# Patient Record
Sex: FEMALE | Race: WHITE | NOT HISPANIC OR LATINO | Employment: FULL TIME | URBAN - METROPOLITAN AREA
[De-identification: names, ages, dates, MRNs, and addresses within clinical notes are randomized per-mention and may not be internally consistent; named-entity substitution may affect disease eponyms.]

---

## 2018-10-11 ASSESSMENT — ANXIETY QUESTIONNAIRES
4. TROUBLE RELAXING: NOT AT ALL
3. WORRYING TOO MUCH ABOUT DIFFERENT THINGS: NOT AT ALL
6. BECOMING EASILY ANNOYED OR IRRITABLE: NOT AT ALL
1. FEELING NERVOUS, ANXIOUS, OR ON EDGE: NOT AT ALL
GAD7 TOTAL SCORE: 0
GAD7 TOTAL SCORE: 0
7. FEELING AFRAID AS IF SOMETHING AWFUL MIGHT HAPPEN: NOT AT ALL
5. BEING SO RESTLESS THAT IT IS HARD TO SIT STILL: NOT AT ALL
2. NOT BEING ABLE TO STOP OR CONTROL WORRYING: NOT AT ALL
7. FEELING AFRAID AS IF SOMETHING AWFUL MIGHT HAPPEN: NOT AT ALL

## 2018-10-11 ASSESSMENT — ENCOUNTER SYMPTOMS
NUMBNESS: 0
NECK MASS: 0
MUSCLE WEAKNESS: 0
DOUBLE VISION: 0
SORE THROAT: 0
HOARSE VOICE: 0
SYNCOPE: 0
MUSCLE CRAMPS: 1
NAIL CHANGES: 0
DECREASED LIBIDO: 1
DISTURBANCES IN COORDINATION: 0
TREMORS: 0
WEAKNESS: 0
HYPERTENSION: 0
MEMORY LOSS: 1
SPEECH CHANGE: 0
PALPITATIONS: 1
LIGHT-HEADEDNESS: 0
SMELL DISTURBANCE: 0
TINGLING: 0
ARTHRALGIAS: 1
SINUS CONGESTION: 0
ORTHOPNEA: 0
BACK PAIN: 0
POOR WOUND HEALING: 0
HYPOTENSION: 0
MYALGIAS: 0
SLEEP DISTURBANCES DUE TO BREATHING: 0
EXERCISE INTOLERANCE: 0
HOT FLASHES: 1
EYE REDNESS: 1
JOINT SWELLING: 0
DIZZINESS: 0
LOSS OF CONSCIOUSNESS: 0
SKIN CHANGES: 0
SEIZURES: 0
EYE IRRITATION: 0
STIFFNESS: 1
EYE PAIN: 0
PARALYSIS: 0
EYE WATERING: 0
TROUBLE SWALLOWING: 0
HEADACHES: 1
SINUS PAIN: 0
TASTE DISTURBANCE: 0
LEG PAIN: 0
NECK PAIN: 1

## 2018-10-12 ENCOUNTER — OFFICE VISIT (OUTPATIENT)
Dept: OBGYN | Facility: CLINIC | Age: 53
End: 2018-10-12
Attending: OBSTETRICS & GYNECOLOGY
Payer: COMMERCIAL

## 2018-10-12 VITALS — WEIGHT: 126.2 LBS | BODY MASS INDEX: 21.02 KG/M2 | HEIGHT: 65 IN

## 2018-10-12 DIAGNOSIS — N90.5 ATROPHY, VULVA: Primary | ICD-10-CM

## 2018-10-12 DIAGNOSIS — L90.0 LICHEN SCLEROSUS ET ATROPHICUS: ICD-10-CM

## 2018-10-12 RX ORDER — METRONIDAZOLE 500 MG/1
TABLET ORAL
COMMUNITY
Start: 2018-08-23 | End: 2024-02-07

## 2018-10-12 RX ORDER — FLUOCINONIDE GEL 0.5 MG/G
GEL TOPICAL
COMMUNITY
Start: 2018-04-24

## 2018-10-12 RX ORDER — CYCLOSPORINE 0.5 MG/ML
EMULSION OPHTHALMIC
COMMUNITY
Start: 2018-09-01 | End: 2024-02-07

## 2018-10-12 RX ORDER — ESTRADIOL 0.1 MG/G
2 CREAM VAGINAL
Qty: 60 G | Refills: 11 | Status: SHIPPED | OUTPATIENT
Start: 2018-10-15 | End: 2024-02-07

## 2018-10-12 RX ORDER — LEVOTHYROXINE SODIUM 88 UG/1
88 CAPSULE ORAL DAILY
COMMUNITY
Start: 2010-01-01

## 2018-10-12 RX ORDER — CLOBETASOL PROPIONATE 0.5 MG/G
OINTMENT TOPICAL PRN
COMMUNITY
Start: 2018-06-19

## 2018-10-12 ASSESSMENT — ANXIETY QUESTIONNAIRES: GAD7 TOTAL SCORE: 0

## 2018-10-12 NOTE — LETTER
10/12/2018       RE: Rose Emerson  [address] [facility] [address]     Dear Colleague,    Thank you for referring your patient, Rose Emerson, to the [facility] at [facility]. Please see a copy of my visit note below.    Progress Note    SUBJECTIVE:  Rose Emerson is an 52 year old PM  here for second opinion for lichen sclerosus of vulva.      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       HISTORY:  Prescription Medications as of 10/12/2018             clobetasol (TEMOVATE) 0.05 % ointment     cycloSPORINE (RESTASIS MULTIDOSE) 0.05 % ophthalmic emulsion     estradiol (ESTRACE VAGINAL) 0.1 MG/GM cream Starting on 10/15/2018. Place 2 g vaginally twice a week    fluocinonide (LIDEX) 0.05 % topical gel     levothyroxine (SYNTHROID) 75 MCG tablet     metroNIDAZOLE (FLAGYL) 500 MG tablet     Olopatadine HCl (PAZEO) 0.7 % ophthalmic solution         Allergies   Allergen Reactions     Amoxicillin Hives     Sulfa Drugs Nausea       There is no immunization history on file for this patient.    Obstetric History       T0      L2     SAB0   TAB0   Ectopic0   Multiple0   Live Births0      Past Medical History:   Diagnosis Date     Hx of previous reproductive problem 2574-6111     Thyroid disease      Past Surgical History:   Procedure Laterality Date     ABDOMEN SURGERY  1998    left ovary removed     BIOPSY  2018, 2016, 2017    vaginal, leg, and cheek     COLONOSCOPY       ENT SURGERY  Oct 2017    neck surgery     GENITOURINARY SURGERY      kidney stone surgery     GYN SURGERY      left ovary removed, infertility procedures     HEAD & NECK SURGERY      neck surgery     ORTHOPEDIC SURGERY  2015    shoulder     Family History   Problem Relation Age of Onset     Other  "Cancer Father      esophogial     Substance Abuse Father      alcohol     Anxiety Disorder Mother      Depression Mother      Thyroid Disease Mother      Substance Abuse Sister      alcohol     Social History     Social History     Marital status:      Spouse name: N/A     Number of children: N/A     Years of education: N/A     Social History Main Topics     Smoking status: Never Smoker     Smokeless tobacco: Never Used     Alcohol use No     Drug use: No     Sexual activity: Not Currently     Partners: Male     Birth control/ protection: Post-menopausal, Female Surgical     Other Topics Concern     None     Social History Narrative     None       ROS    EXAM:  Height 1.651 m (5' 5\"), weight 57.2 kg (126 lb 3.2 oz). Body mass index is 21 kg/(m^2).  General - pleasant female in no acute distress.  Skin - no suspicious lesions or rashes  EENT-  PERRLA, euthyroid with out palpable nodules  Neck - supple without lymphadenopathy.  Lungs - clear to auscultation bilaterally.  Heart - regular rate and rhythm without murmur.  Abdomen - soft, nontender, nondistended, no masses or organomegaly noted.  Musculoskeletal - no gross deformities.  Neurological - normal strength, sensation, and mental status.    Breast Exam:  Breast: Without visible skin changes. No dimpling or lesions seen.   Breasts supple, non-tender with palpation, no dominant mass, nodularity, or nipple discharge noted bilaterally. Axillary nodes negative.      Pelvic Exam:  EG/BUS: pale, thin, pink skin. No discreet lesions. Complete simplification of right labium minorum. some reduction of left labium minorum. Complete simplification of clitoral davis.    Urethral meatus: normal   Urethra: no masses, tenderness, or scarring   Bladder: no masses or tenderness   Vagina: think dry and pale mucosa with petechiae.  odorless and clear  secretions  Anus: no lesions, erythematous plaques bilaterally and circumferentially around anus    ASSESSMENT:  Lichen " sclerosus et atrophicus     PLAN:      Estrogen Cream:  - Start using two times a weeks, applying internally with extra cream on the vaginal opening  - Continue to use twice/week until next follow-up appointment    Clobetasol Cream:  - Only use when having increased irritation or flare  - Start with application to external genitialia 4x per day for three days   - 3x per day for three days   - 2x per day for three days   - 1x per day for three days   - Discontinue until next flare  * On the days when you use clobetasol and estrogen cream, it is ok to use them at the same time and mix    Ok to attempt sexual intercourse when you feel comfortable or willing to try    Ashley Cooper          Again, thank you for allowing me to participate in the care of your patient.      Sincerely,    Ashley Cooper MD

## 2018-10-12 NOTE — PATIENT INSTRUCTIONS
Estrogen Cream:  - Start using two times a weeks, applying internally with extra cream on the vaginal opening  - Continue to use twice/week until next follow-up appointment    Clobetasol Cream:  - Only use when having increased irritation or flare  - Start with application to external genitialia 4x per day for three days   - 3x per day for three days   - 2x per day for three days   - 1x per day for three days   - Discontinue until next flare  * On the days when you use clobetasol and estrogen cream, it is ok to use them at the same time and mix    Ok to attempt sexual intercourse when you feel comfortable or willing to try

## 2018-10-12 NOTE — PROGRESS NOTES
Progress Note    SUBJECTIVE:  Rose Emerson is an 52 year old PM  here for second opinion for lichen sclerosus of vulva.      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       HISTORY:  Prescription Medications as of 10/12/2018             clobetasol (TEMOVATE) 0.05 % ointment     cycloSPORINE (RESTASIS MULTIDOSE) 0.05 % ophthalmic emulsion     estradiol (ESTRACE VAGINAL) 0.1 MG/GM cream Starting on 10/15/2018. Place 2 g vaginally twice a week    fluocinonide (LIDEX) 0.05 % topical gel     levothyroxine (SYNTHROID) 75 MCG tablet     metroNIDAZOLE (FLAGYL) 500 MG tablet     Olopatadine HCl (PAZEO) 0.7 % ophthalmic solution         Allergies   Allergen Reactions     Amoxicillin Hives     Sulfa Drugs Nausea       There is no immunization history on file for this patient.    Obstetric History       T0      L2     SAB0   TAB0   Ectopic0   Multiple0   Live Births0      Past Medical History:   Diagnosis Date     Hx of previous reproductive problem [id number]     Thyroid disease      Past Surgical History:   Procedure Laterality Date     ABDOMEN SURGERY  1998    left ovary removed     BIOPSY  2018, , 2017    vaginal, leg, and cheek     COLONOSCOPY       ENT SURGERY  Oct 2017    neck surgery     GENITOURINARY SURGERY      kidney stone surgery     GYN SURGERY      left ovary removed, infertility procedures     HEAD & NECK SURGERY      neck surgery     ORTHOPEDIC SURGERY  2015    shoulder     Family History   Problem Relation Age of Onset     Other Cancer Father      esophogial     Substance Abuse Father      alcohol     Anxiety Disorder Mother      Depression Mother      Thyroid Disease Mother      Substance Abuse Sister      alcohol     Social History     Social History     Marital status:      Spouse name: N/A     Number of  "children: N/A     Years of education: N/A     Social History Main Topics     Smoking status: Never Smoker     Smokeless tobacco: Never Used     Alcohol use No     Drug use: No     Sexual activity: Not Currently     Partners: Male     Birth control/ protection: Post-menopausal, Female Surgical     Other Topics Concern     None     Social History Narrative     None       ROS    EXAM:  Height 1.651 m (5' 5\"), weight 57.2 kg (126 lb 3.2 oz). Body mass index is 21 kg/(m^2).  General - pleasant female in no acute distress.  Skin - no suspicious lesions or rashes  EENT-  PERRLA, euthyroid with out palpable nodules  Neck - supple without lymphadenopathy.  Lungs - clear to auscultation bilaterally.  Heart - regular rate and rhythm without murmur.  Abdomen - soft, nontender, nondistended, no masses or organomegaly noted.  Musculoskeletal - no gross deformities.  Neurological - normal strength, sensation, and mental status.    Breast Exam:  Breast: Without visible skin changes. No dimpling or lesions seen.   Breasts supple, non-tender with palpation, no dominant mass, nodularity, or nipple discharge noted bilaterally. Axillary nodes negative.      Pelvic Exam:  EG/BUS: pale, thin, pink skin. No discreet lesions. Complete simplification of right labium minorum. some reduction of left labium minorum. Complete simplification of clitoral davis.    Urethral meatus: normal   Urethra: no masses, tenderness, or scarring   Bladder: no masses or tenderness   Vagina: think dry and pale mucosa with petechiae.  odorless and clear  secretions  Anus: no lesions, erythematous plaques bilaterally and circumferentially around anus    ASSESSMENT:  Lichen sclerosus et atrophicus     PLAN:      Estrogen Cream:  - Start using two times a weeks, applying internally with extra cream on the vaginal opening  - Continue to use twice/week until next follow-up appointment    Clobetasol Cream:  - Only use when having increased irritation or flare  - Start " with application to external genitialia 4x per day for three days   - 3x per day for three days   - 2x per day for three days   - 1x per day for three days   - Discontinue until next flare  * On the days when you use clobetasol and estrogen cream, it is ok to use them at the same time and mix    Ok to attempt sexual intercourse when you feel comfortable or willing to try    Ashley Cooper

## 2018-10-12 NOTE — LETTER
Date:October 16, 2018      Patient was self referred, no letter generated. Do not send.        [facility] Information

## 2018-10-12 NOTE — LETTER
October 12, 2018        Rose Emerson  [address] [facility] [address]    To Whom it May Concern:    Rose Emerson was seen in our office on 10/12/2018 and was given the following instructions:  Use a stand up desk at work with the option to sit every 2 hours.    Sincerely,        Ashley Cooper MD

## 2018-10-12 NOTE — MR AVS SNAPSHOT
After Visit Summary   10/12/2018    Rose Emerson    MRN: [id number]           Patient Information     Date Of Birth          1965        Visit Information        Provider Department      10/12/2018 1:15 PM Ashley Cooper MD [facility]        Today's Diagnoses     Atrophy, vulva    -  1      Care Instructions    Estrogen Cream:  - Start using two times a weeks, applying internally with extra cream on the vaginal opening  - Continue to use twice/week until next follow-up appointment    Clobetasol Cream:  - Only use when having increased irritation or flare  - Start with application to external genitialia 4x per day for three days   - 3x per day for three days   - 2x per day for three days   - 1x per day for three days   - Discontinue until next flare  * On the days when you use clobetasol and estrogen cream, it is ok to use them at the same time and mix    Ok to attempt sexual intercourse when you feel comfortable or willing to try          Follow-ups after your visit        Follow-up notes from your care team     Return in about 6 weeks (around 11/23/2018) for Follow-up.      Who to contact     Please call your clinic at [phone] to:    Ask questions about your health    Make or cancel appointments    Discuss your medicines    Learn about your test results    Speak to your doctor            Additional Information About Your Visit        [employer] Information     [employer] gives you secure access to your electronic health record. If you see a primary care provider, you can also send messages to your care team and make appointments. If you have questions, please call your primary care clinic.  If you do not have a primary care provider, please call [phone] and they will assist you.      [employer] is an electronic gateway that provides easy, online access to your medical records. With [employer], you can request a clinic appointment, read your test results, renew a  "prescription or communicate with your care team.     To access your existing account, please contact your [facility] or call [phone] for assistance.        Care EveryWhere ID     This is your Care EveryWhere ID. This could be used by other organizations to access your [place] medical records  [id number]        Your Vitals Were     Height BMI (Body Mass Index)                1.651 m (5' 5\") 21 kg/m2           Blood Pressure from Last 3 Encounters:   No data found for BP    Weight from Last 3 Encounters:   10/12/18 57.2 kg (126 lb 3.2 oz)              Today, you had the following     No orders found for display         Today's Medication Changes          These changes are accurate as of 10/12/18  1:50 PM.  If you have any questions, ask your nurse or doctor.               Start taking these medicines.        Dose/Directions    estradiol 0.1 MG/GM cream   Commonly known as:  ESTRACE VAGINAL   Used for:  Atrophy, vulva   Started by:  Ashley Cooper MD        Dose:  2 g   Start taking on:  10/15/2018   Place 2 g vaginally twice a week   Quantity:  60 g   Refills:  11            Where to get your medicines      These medications were sent to [facility][id number] - [facility] [address]  [address] [address]     Phone:  [phone]     estradiol 0.1 MG/GM cream                Primary Care Provider Fax #    Physician No Ref-Primary [phone]       No address on file        Equal Access to Services     DANIELLE BLACKMAN AH: Hadii cortez larsono Somark, waaxda luqadaha, qaybta kaalmada ademelonyda, akshat rolon . So [facility] [phone].    ATENCIÓN: Si habla español, tiene a strange disposición servicios gratuitos de asistencia lingüística. Llame al [phone].    We comply with applicable federal civil rights laws and [place] laws. We do not discriminate on the basis of race, color, national origin, age, disability, sex, sexual orientation, " or gender identity.            Thank you!     Thank you for choosing [facility]  for your care. Our goal is always to provide you with excellent care. Hearing back from our patients is one way we can continue to improve our services. Please take a few minutes to complete the written survey that you may receive in the mail after your visit with us. Thank you!             Your Updated Medication List - Protect others around you: Learn how to safely use, store and throw away your medicines at www.disposemymeds.org.          This list is accurate as of 10/12/18  1:50 PM.  Always use your most recent med list.                   Brand Name Dispense Instructions for use Diagnosis    clobetasol 0.05 % ointment    TEMOVATE          estradiol 0.1 MG/GM cream   Start taking on:  10/15/2018    ESTRACE VAGINAL    60 g    Place 2 g vaginally twice a week    Atrophy, vulva       fluocinonide 0.05 % topical gel    LIDEX          metroNIDAZOLE 500 MG tablet    FLAGYL          PAZEO 0.7 % ophthalmic solution   Generic drug:  Olopatadine HCl           RESTASIS MULTIDOSE 0.05 % ophthalmic emulsion   Generic drug:  cycloSPORINE           SYNTHROID 75 MCG tablet   Generic drug:  levothyroxine

## 2018-10-15 ENCOUNTER — HEALTH MAINTENANCE LETTER (OUTPATIENT)
Age: 53
End: 2018-10-15

## 2018-12-05 ENCOUNTER — OFFICE VISIT (OUTPATIENT)
Dept: OBGYN | Facility: CLINIC | Age: 53
End: 2018-12-05
Attending: OBSTETRICS & GYNECOLOGY
Payer: COMMERCIAL

## 2018-12-05 VITALS
BODY MASS INDEX: 20.99 KG/M2 | WEIGHT: 126 LBS | SYSTOLIC BLOOD PRESSURE: 107 MMHG | HEIGHT: 65 IN | HEART RATE: 65 BPM | DIASTOLIC BLOOD PRESSURE: 65 MMHG

## 2018-12-05 DIAGNOSIS — L90.0 LICHEN SCLEROSUS: Primary | ICD-10-CM

## 2018-12-05 RX ORDER — SILVER SULFADIAZINE 10 MG/G
CREAM TOPICAL
Qty: 60 G | Refills: 3 | Status: SHIPPED | OUTPATIENT
Start: 2018-12-05 | End: 2024-02-07

## 2018-12-05 NOTE — MR AVS SNAPSHOT
"              After Visit Summary   12/5/2018    Rose Emerson    MRN: [id number]           Patient Information     Date Of Birth          1965        Visit Information        Provider Department      12/5/2018 11:00 AM Ashley Cooper MD [facility]        Today's Diagnoses     Lichen sclerosus    -  1       Follow-ups after your visit        Who to contact     Please call your clinic at [phone] to:    Ask questions about your health    Make or cancel appointments    Discuss your medicines    Learn about your test results    Speak to your doctor            Additional Information About Your Visit        MyChart Information     [employer] gives you secure access to your electronic health record. If you see a primary care provider, you can also send messages to your care team and make appointments. If you have questions, please call your primary care clinic.  If you do not have a primary care provider, please call [phone] and they will assist you.      [employer] is an electronic gateway that provides easy, online access to your medical records. With [employer], you can request a clinic appointment, read your test results, renew a prescription or communicate with your care team.     To access your existing account, please contact your [facility] or call [phone] for assistance.        Care EveryWhere ID     This is your Care EveryWhere ID. This could be used by other organizations to access your [place] medical records  [id number]        Your Vitals Were     Pulse Height BMI (Body Mass Index)             65 1.651 m (5' 5\") 20.97 kg/m2          Blood Pressure from Last 3 Encounters:   12/05/18 107/65    Weight from Last 3 Encounters:   12/05/18 57.2 kg (126 lb)   10/12/18 57.2 kg (126 lb 3.2 oz)              Today, you had the following     No orders found for display         Today's Medication Changes          These changes are accurate as of " 12/5/18 11:59 PM.  If you have any questions, ask your nurse or doctor.               Start taking these medicines.        Dose/Directions    silver sulfADIAZINE 1 % external cream   Commonly known as:  SILVADENE   Used for:  Lichen sclerosus   Started by:  Ashley Cooper MD        Apply small amount (half inch squeeze) to vulva prn dryness or pain.   Quantity:  60 g   Refills:  3            Where to get your medicines      These medications were sent to [facility] #[id number] - [place], [place] - [address]  [address], [facility] [address]     Phone:  [phone]     silver sulfADIAZINE 1 % external cream                Primary Care Provider Fax #    Physician No Ref-Primary [phone]       No address on file        Equal Access to Services     DANIELLE BLACKMAN : Kapil Moncada, waaxda luqadaha, qaybta kaalmada adeegyaalicja, akshat rolon . So [facility] [phone].    ATENCIÓN: Si habla español, tiene a strange disposición servicios gratuitos de asistencia lingüística. [facility] [phone].    We comply with applicable federal civil rights laws and [place] laws. We do not discriminate on the basis of race, color, national origin, age, disability, sex, sexual orientation, or gender identity.            Thank you!     Thank you for choosing [facility]  for your care. Our goal is always to provide you with excellent care. Hearing back from our patients is one way we can continue to improve our services. Please take a few minutes to complete the written survey that you may receive in the mail after your visit with us. Thank you!             Your Updated Medication List - Protect others around you: Learn how to safely use, store and throw away your medicines at www.disposemymeds.org.          This list is accurate as of 12/5/18 11:59 PM.  Always use your most recent med list.                   Brand Name Dispense Instructions for use Diagnosis    clobetasol  0.05 % external ointment    TEMOVATE          estradiol 0.1 MG/GM vaginal cream    ESTRACE VAGINAL    60 g    Place 2 g vaginally twice a week    Atrophy, vulva       fluocinonide 0.05 % external gel    LIDEX          metroNIDAZOLE 500 MG tablet    FLAGYL          PAZEO 0.7 % ophthalmic solution   Generic drug:  olopatadine           RESTASIS MULTIDOSE 0.05 % ophthalmic emulsion   Generic drug:  cycloSPORINE           silver sulfADIAZINE 1 % external cream    SILVADENE    60 g    Apply small amount (half inch squeeze) to vulva prn dryness or pain.    Lichen sclerosus       SYNTHROID 75 MCG tablet   Generic drug:  levothyroxine

## 2018-12-05 NOTE — PROGRESS NOTES
"S: states she didn't start topical estrogen due to fears of stroke (her mother had at age 54 on oral premarin) and other clotting/bleeding history in family.   Not able to have IC but not wanting to use E2 or dilators.     States symptoms relatively well controlled with clobetasol. Discussed possible use of silvadene in lieu of estrogen.     Patient Active Problem List   Diagnosis     Lichen sclerosus et atrophicus of the vulva     Past Medical History:   Diagnosis Date     Hx of previous reproductive problem 7746-3030     Thyroid disease 2010     /65  Pulse 65  Ht 1.651 m (5' 5\")  Wt 57.2 kg (126 lb)  BMI 20.97 kg/m2  Appears well  EG:  pale, thin, pink skin. Complete simplification of right labium minorum. some reduction of left labium minorum. Complete simplification of clitoral davis.  no lesions. Introitus admits 2 digits.         A: lichen sclerosus  P: consider adding silvadene  Consider dilators if IC desired  Consider referral familial counseling center for vague but concerning family history of bleeding/clotting disorders.     Ashley Cooper                     "

## 2018-12-05 NOTE — LETTER
Date:December 10, 2018      Patient was self referred, no letter generated. Do not send.        [facility] Information

## 2018-12-05 NOTE — LETTER
"12/5/2018       RE: Rose Emerson  [address] [address] [address]     Dear Colleague,    Thank you for referring your patient, Rose Emerson, to the [facility] at [facility]. Please see a copy of my visit note below.    S: states she didn't start topical estrogen due to fears of stroke (her mother had at age 54 on oral premarin) and other clotting/bleeding history in family.   Not able to have IC but not wanting to use E2 or dilators.     States symptoms relatively well controlled with clobetasol. Discussed possible use of silvadene in lieu of estrogen.     Patient Active Problem List   Diagnosis     Lichen sclerosus et atrophicus of the vulva     Past Medical History:   Diagnosis Date     Hx of previous reproductive problem 4235-9053     Thyroid disease 2010     /65  Pulse 65  Ht 1.651 m (5' 5\")  Wt 57.2 kg (126 lb)  BMI 20.97 kg/m2  Appears well  EG:  pale, thin, pink skin. Complete simplification of right labium minorum. some reduction of left labium minorum. Complete simplification of clitoral davis.   no lesions. Introitus admits 2 digits.         A: lichen sclerosus  P: consider adding silvadene  Consider dilators if IC desired  Consider referral familial counseling center for vague but concerning family history of bleeding/clotting disorders.     Ashley Cooper                          Again, thank you for allowing me to participate in the care of your patient.      Sincerely,    Ashley Cooper MD      "

## 2019-11-06 ENCOUNTER — HEALTH MAINTENANCE LETTER (OUTPATIENT)
Age: 54
End: 2019-11-06

## 2020-11-29 ENCOUNTER — HEALTH MAINTENANCE LETTER (OUTPATIENT)
Age: 55
End: 2020-11-29

## 2021-09-19 ENCOUNTER — HEALTH MAINTENANCE LETTER (OUTPATIENT)
Age: 56
End: 2021-09-19

## 2021-11-14 ENCOUNTER — HEALTH MAINTENANCE LETTER (OUTPATIENT)
Age: 56
End: 2021-11-14

## 2022-01-09 ENCOUNTER — HEALTH MAINTENANCE LETTER (OUTPATIENT)
Age: 57
End: 2022-01-09

## 2022-11-21 ENCOUNTER — HEALTH MAINTENANCE LETTER (OUTPATIENT)
Age: 57
End: 2022-11-21

## 2023-04-16 ENCOUNTER — HEALTH MAINTENANCE LETTER (OUTPATIENT)
Age: 58
End: 2023-04-16

## 2023-11-26 ENCOUNTER — HEALTH MAINTENANCE LETTER (OUTPATIENT)
Age: 58
End: 2023-11-26

## 2024-01-16 ENCOUNTER — MEDICAL CORRESPONDENCE (OUTPATIENT)
Dept: HEALTH INFORMATION MANAGEMENT | Facility: CLINIC | Age: 59
End: 2024-01-16
Payer: COMMERCIAL

## 2024-02-07 RX ORDER — BETAMETHASONE DIPROPIONATE 0.05 %
GEL (GRAM) TOPICAL 2 TIMES DAILY PRN
COMMUNITY
End: 2024-02-07

## 2024-02-07 RX ORDER — CYCLOBENZAPRINE HCL 10 MG
10 TABLET ORAL 3 TIMES DAILY PRN
COMMUNITY

## 2024-02-07 RX ORDER — FOLIC ACID 1 MG/1
1 TABLET ORAL DAILY
COMMUNITY

## 2024-02-07 RX ORDER — PANTOPRAZOLE SODIUM 40 MG/1
40 TABLET, DELAYED RELEASE ORAL DAILY
COMMUNITY

## 2024-02-07 RX ORDER — CLONAZEPAM 0.25 MG/1
.25-.5 TABLET, ORALLY DISINTEGRATING ORAL
COMMUNITY
End: 2024-02-07

## 2024-02-07 RX ORDER — ATORVASTATIN CALCIUM 20 MG/1
20 TABLET, FILM COATED ORAL DAILY
COMMUNITY

## 2024-02-07 RX ORDER — DESONIDE 0.5 MG/G
OINTMENT TOPICAL 2 TIMES DAILY PRN
COMMUNITY

## 2024-02-07 RX ORDER — CITALOPRAM HYDROBROMIDE 10 MG/1
10 TABLET ORAL DAILY
COMMUNITY

## 2024-02-07 RX ORDER — LIDOCAINE HYDROCHLORIDE 20 MG/ML
SOLUTION OROPHARYNGEAL EVERY 4 HOURS PRN
COMMUNITY

## 2024-02-07 RX ORDER — TRIAMCINOLONE ACETONIDE 1 MG/G
OINTMENT TOPICAL 2 TIMES DAILY
COMMUNITY

## 2024-02-07 RX ORDER — TIZANIDINE 2 MG/1
2 TABLET ORAL EVERY 6 HOURS PRN
COMMUNITY
End: 2024-02-07

## 2024-02-07 NOTE — PROGRESS NOTES
PTA medications updated by Medication Scribe prior to surgery via phone call with patient (last doses completed by Nurse)     Medication history sources: Patient and H&P  In the past week, patient estimated taking medication this percent of the time: Greater than 90%      Significant changes made to the medication list:  Patient reports no longer taking the following meds (med scribe removed from PTA med list): Augmented betamethasone gel, Klonopin, flagyl, olopatadine, silvadene, Restasis, Estrace vag cream      Additional medication history information:   Patient was advised to bring: Xiidra OP soln.    Medication reconciliation completed by provider prior to medication history? No    Time spent in this activity: 60 minutes    The information provided in this note is only as accurate as the sources available at the time of update(s)      Prior to Admission medications    Medication Sig Last Dose Taking? Auth Provider Long Term End Date   atorvastatin (LIPITOR) 20 MG tablet Take 20 mg by mouth daily 2/7/2024 at pm Yes Reported, Patient Yes    citalopram (CELEXA) 10 MG tablet Take 10 mg by mouth daily 2/7/2024 at pm Yes Reported, Patient Yes    clobetasol (TEMOVATE) 0.05 % ointment as needed Unknown at prn Yes Reported, Patient     cyclobenzaprine (FLEXERIL) 10 MG tablet Take 10 mg by mouth 3 times daily as needed for muscle spasms Unknown at prn Yes Reported, Patient     desonide (DESOWEN) 0.05 % external ointment Apply topically 2 times daily as needed Unknown at prn Yes Reported, Patient     fluocinonide (LIDEX) 0.05 % topical gel  month ago at prn Yes Reported, Patient     folic acid (FOLVITE) 1 MG tablet Take 1 mg by mouth daily 2/7/2024 at am Yes Reported, Patient     levothyroxine (TIROSINT) 88 MCG capsule Take 88 mcg by mouth daily  at am Yes Reported, Patient Yes    lidocaine, viscous, (XYLOCAINE) 2 % solution Swish and spit in mouth every 4 hours as needed for pain ; Max 8 doses/24 hour period. Unknown at  prn Yes Reported, Patient     lifitegrast (XIIDRA) 5 % opthalmic solution Place 1 drop into both eyes 2 times daily 2/7/2024 at pm Yes Reported, Patient     METHOTREXATE, ANTI-RHEUMATIC, PO Take 15 mg by mouth once a week (Sundays) 2/4/2024 at am Yes Reported, Patient Yes    NONFORMULARY 5 mLs 2 times daily as needed (swich & spit) Dexamethasone 0.5 mg/5 ml solution Unknown at prn Yes Reported, Patient     pantoprazole (PROTONIX) 40 MG EC tablet Take 40 mg by mouth daily 2/7/2024 at am Yes Reported, Patient     triamcinolone (KENALOG) 0.1 % external ointment Apply topically 2 times daily Unknown at prn Yes Reported, Patient         Medication history completed by: Saida Roberts LPN

## 2024-02-08 ENCOUNTER — ANESTHESIA EVENT (OUTPATIENT)
Dept: SURGERY | Facility: CLINIC | Age: 59
End: 2024-02-08
Payer: COMMERCIAL

## 2024-02-08 ENCOUNTER — ANESTHESIA (OUTPATIENT)
Dept: SURGERY | Facility: CLINIC | Age: 59
End: 2024-02-08
Payer: COMMERCIAL

## 2024-02-08 ENCOUNTER — HOSPITAL ENCOUNTER (OUTPATIENT)
Facility: CLINIC | Age: 59
Discharge: HOME | End: 2024-02-08
Attending: DENTIST | Admitting: DENTIST
Payer: COMMERCIAL

## 2024-02-08 VITALS
SYSTOLIC BLOOD PRESSURE: 116 MMHG | TEMPERATURE: 98 F | RESPIRATION RATE: 14 BRPM | BODY MASS INDEX: 25.06 KG/M2 | WEIGHT: 146.8 LBS | DIASTOLIC BLOOD PRESSURE: 67 MMHG | HEART RATE: 80 BPM | OXYGEN SATURATION: 97 % | HEIGHT: 64 IN

## 2024-02-08 DIAGNOSIS — M26.69 CLOSED LOCK OF TEMPOROMANDIBULAR JOINT WITHOUT REDUCTION: Primary | ICD-10-CM

## 2024-02-08 PROCEDURE — 258N000003 HC RX IP 258 OP 636

## 2024-02-08 PROCEDURE — 250N000009 HC RX 250: Performed by: NURSE ANESTHETIST, CERTIFIED REGISTERED

## 2024-02-08 PROCEDURE — 250N000011 HC RX IP 250 OP 636: Performed by: DENTIST

## 2024-02-08 PROCEDURE — 250N000011 HC RX IP 250 OP 636

## 2024-02-08 PROCEDURE — 250N000011 HC RX IP 250 OP 636: Performed by: STUDENT IN AN ORGANIZED HEALTH CARE EDUCATION/TRAINING PROGRAM

## 2024-02-08 PROCEDURE — 360N000076 HC SURGERY LEVEL 3, PER MIN: Performed by: DENTIST

## 2024-02-08 PROCEDURE — 258N000003 HC RX IP 258 OP 636: Performed by: DENTIST

## 2024-02-08 PROCEDURE — 710N000009 HC RECOVERY PHASE 1, LEVEL 1, PER MIN: Performed by: DENTIST

## 2024-02-08 PROCEDURE — 250N000013 HC RX MED GY IP 250 OP 250 PS 637: Performed by: DENTIST

## 2024-02-08 PROCEDURE — 272N000001 HC OR GENERAL SUPPLY STERILE: Performed by: DENTIST

## 2024-02-08 PROCEDURE — 710N000012 HC RECOVERY PHASE 2, PER MINUTE: Performed by: DENTIST

## 2024-02-08 PROCEDURE — 999N000141 HC STATISTIC PRE-PROCEDURE NURSING ASSESSMENT: Performed by: DENTIST

## 2024-02-08 PROCEDURE — 250N000009 HC RX 250

## 2024-02-08 PROCEDURE — 250N000011 HC RX IP 250 OP 636: Performed by: NURSE ANESTHETIST, CERTIFIED REGISTERED

## 2024-02-08 PROCEDURE — 250N000025 HC SEVOFLURANE, PER MIN: Performed by: DENTIST

## 2024-02-08 PROCEDURE — 370N000017 HC ANESTHESIA TECHNICAL FEE, PER MIN: Performed by: DENTIST

## 2024-02-08 RX ORDER — SODIUM CHLORIDE, SODIUM LACTATE, POTASSIUM CHLORIDE, CALCIUM CHLORIDE 600; 310; 30; 20 MG/100ML; MG/100ML; MG/100ML; MG/100ML
INJECTION, SOLUTION INTRAVENOUS CONTINUOUS PRN
Status: DISCONTINUED | OUTPATIENT
Start: 2024-02-08 | End: 2024-02-08

## 2024-02-08 RX ORDER — CEFAZOLIN SODIUM/WATER 2 G/20 ML
2 SYRINGE (ML) INTRAVENOUS
Status: COMPLETED | OUTPATIENT
Start: 2024-02-08 | End: 2024-02-08

## 2024-02-08 RX ORDER — FENTANYL CITRATE 50 UG/ML
INJECTION, SOLUTION INTRAMUSCULAR; INTRAVENOUS PRN
Status: DISCONTINUED | OUTPATIENT
Start: 2024-02-08 | End: 2024-02-08

## 2024-02-08 RX ORDER — DEXAMETHASONE SODIUM PHOSPHATE 4 MG/ML
INJECTION, SOLUTION INTRA-ARTICULAR; INTRALESIONAL; INTRAMUSCULAR; INTRAVENOUS; SOFT TISSUE PRN
Status: DISCONTINUED | OUTPATIENT
Start: 2024-02-08 | End: 2024-02-08

## 2024-02-08 RX ORDER — ONDANSETRON 4 MG/1
4 TABLET, ORALLY DISINTEGRATING ORAL EVERY 30 MIN PRN
Status: DISCONTINUED | OUTPATIENT
Start: 2024-02-08 | End: 2024-02-08 | Stop reason: HOSPADM

## 2024-02-08 RX ORDER — FENTANYL CITRATE 0.05 MG/ML
25 INJECTION, SOLUTION INTRAMUSCULAR; INTRAVENOUS EVERY 5 MIN PRN
Status: DISCONTINUED | OUTPATIENT
Start: 2024-02-08 | End: 2024-02-08 | Stop reason: HOSPADM

## 2024-02-08 RX ORDER — BUPIVACAINE HYDROCHLORIDE 2.5 MG/ML
INJECTION, SOLUTION EPIDURAL; INFILTRATION; INTRACAUDAL
Status: DISCONTINUED
Start: 2024-02-08 | End: 2024-02-08 | Stop reason: HOSPADM

## 2024-02-08 RX ORDER — OXYCODONE AND ACETAMINOPHEN 5; 325 MG/1; MG/1
1 TABLET ORAL EVERY 6 HOURS PRN
Qty: 12 TABLET | Refills: 0 | Status: SHIPPED | OUTPATIENT
Start: 2024-02-08 | End: 2024-02-11

## 2024-02-08 RX ORDER — HYDROMORPHONE HCL IN WATER/PF 6 MG/30 ML
0.4 PATIENT CONTROLLED ANALGESIA SYRINGE INTRAVENOUS EVERY 5 MIN PRN
Status: DISCONTINUED | OUTPATIENT
Start: 2024-02-08 | End: 2024-02-08 | Stop reason: HOSPADM

## 2024-02-08 RX ORDER — FENTANYL CITRATE 50 UG/ML
INJECTION, SOLUTION INTRAMUSCULAR; INTRAVENOUS PRN
Status: DISCONTINUED | OUTPATIENT
Start: 2024-02-08 | End: 2024-02-08 | Stop reason: HOSPADM

## 2024-02-08 RX ORDER — PROPOFOL 10 MG/ML
INJECTION, EMULSION INTRAVENOUS PRN
Status: DISCONTINUED | OUTPATIENT
Start: 2024-02-08 | End: 2024-02-08

## 2024-02-08 RX ORDER — FENTANYL CITRATE 0.05 MG/ML
50 INJECTION, SOLUTION INTRAMUSCULAR; INTRAVENOUS EVERY 5 MIN PRN
Status: DISCONTINUED | OUTPATIENT
Start: 2024-02-08 | End: 2024-02-08 | Stop reason: HOSPADM

## 2024-02-08 RX ORDER — ONDANSETRON 2 MG/ML
INJECTION INTRAMUSCULAR; INTRAVENOUS PRN
Status: DISCONTINUED | OUTPATIENT
Start: 2024-02-08 | End: 2024-02-08

## 2024-02-08 RX ORDER — HYDROMORPHONE HCL IN WATER/PF 6 MG/30 ML
0.2 PATIENT CONTROLLED ANALGESIA SYRINGE INTRAVENOUS EVERY 5 MIN PRN
Status: DISCONTINUED | OUTPATIENT
Start: 2024-02-08 | End: 2024-02-08 | Stop reason: HOSPADM

## 2024-02-08 RX ORDER — FENTANYL CITRATE-0.9 % NACL/PF 10 MCG/ML
PLASTIC BAG, INJECTION (ML) INTRAVENOUS PRN
Status: DISCONTINUED | OUTPATIENT
Start: 2024-02-08 | End: 2024-02-08

## 2024-02-08 RX ORDER — BUPIVACAINE HYDROCHLORIDE 2.5 MG/ML
INJECTION, SOLUTION INFILTRATION; PERINEURAL PRN
Status: DISCONTINUED | OUTPATIENT
Start: 2024-02-08 | End: 2024-02-08 | Stop reason: HOSPADM

## 2024-02-08 RX ORDER — DEXAMETHASONE SODIUM PHOSPHATE 4 MG/ML
INJECTION, SOLUTION INTRA-ARTICULAR; INTRALESIONAL; INTRAMUSCULAR; INTRAVENOUS; SOFT TISSUE PRN
Status: DISCONTINUED | OUTPATIENT
Start: 2024-02-08 | End: 2024-02-08 | Stop reason: HOSPADM

## 2024-02-08 RX ORDER — CHLORHEXIDINE GLUCONATE ORAL RINSE 1.2 MG/ML
10 SOLUTION DENTAL ONCE
Status: COMPLETED | OUTPATIENT
Start: 2024-02-08 | End: 2024-02-08

## 2024-02-08 RX ORDER — HEPARIN SODIUM 1000 [USP'U]/ML
INJECTION, SOLUTION INTRAVENOUS; SUBCUTANEOUS
Status: DISCONTINUED
Start: 2024-02-08 | End: 2024-02-08 | Stop reason: HOSPADM

## 2024-02-08 RX ORDER — ONDANSETRON 2 MG/ML
4 INJECTION INTRAMUSCULAR; INTRAVENOUS EVERY 30 MIN PRN
Status: DISCONTINUED | OUTPATIENT
Start: 2024-02-08 | End: 2024-02-08 | Stop reason: HOSPADM

## 2024-02-08 RX ORDER — OXYCODONE AND ACETAMINOPHEN 5; 325 MG/1; MG/1
1 TABLET ORAL
Status: COMPLETED | OUTPATIENT
Start: 2024-02-08 | End: 2024-02-08

## 2024-02-08 RX ORDER — DEXMEDETOMIDINE HYDROCHLORIDE 4 UG/ML
INJECTION, SOLUTION INTRAVENOUS PRN
Status: DISCONTINUED | OUTPATIENT
Start: 2024-02-08 | End: 2024-02-08

## 2024-02-08 RX ORDER — SODIUM CHLORIDE, SODIUM LACTATE, POTASSIUM CHLORIDE, CALCIUM CHLORIDE 600; 310; 30; 20 MG/100ML; MG/100ML; MG/100ML; MG/100ML
INJECTION, SOLUTION INTRAVENOUS CONTINUOUS
Status: DISCONTINUED | OUTPATIENT
Start: 2024-02-08 | End: 2024-02-08 | Stop reason: HOSPADM

## 2024-02-08 RX ORDER — DEXAMETHASONE SODIUM PHOSPHATE 4 MG/ML
INJECTION, SOLUTION INTRA-ARTICULAR; INTRALESIONAL; INTRAMUSCULAR; INTRAVENOUS; SOFT TISSUE
Status: DISCONTINUED
Start: 2024-02-08 | End: 2024-02-08 | Stop reason: HOSPADM

## 2024-02-08 RX ORDER — CEFAZOLIN SODIUM/WATER 2 G/20 ML
2 SYRINGE (ML) INTRAVENOUS SEE ADMIN INSTRUCTIONS
Status: DISCONTINUED | OUTPATIENT
Start: 2024-02-08 | End: 2024-02-08 | Stop reason: HOSPADM

## 2024-02-08 RX ORDER — LIDOCAINE HYDROCHLORIDE 20 MG/ML
INJECTION, SOLUTION INFILTRATION; PERINEURAL PRN
Status: DISCONTINUED | OUTPATIENT
Start: 2024-02-08 | End: 2024-02-08

## 2024-02-08 RX ORDER — CEPHALEXIN 500 MG/1
500 CAPSULE ORAL 3 TIMES DAILY
Qty: 21 CAPSULE | Refills: 0 | Status: SHIPPED | OUTPATIENT
Start: 2024-02-08 | End: 2024-02-15

## 2024-02-08 RX ADMIN — SODIUM CHLORIDE, POTASSIUM CHLORIDE, SODIUM LACTATE AND CALCIUM CHLORIDE: 600; 310; 30; 20 INJECTION, SOLUTION INTRAVENOUS at 14:26

## 2024-02-08 RX ADMIN — FENTANYL CITRATE 50 MCG: 50 INJECTION, SOLUTION INTRAMUSCULAR; INTRAVENOUS at 14:26

## 2024-02-08 RX ADMIN — PROPOFOL 200 MG: 10 INJECTION, EMULSION INTRAVENOUS at 14:33

## 2024-02-08 RX ADMIN — DEXMEDETOMIDINE HYDROCHLORIDE 8 MCG: 200 INJECTION INTRAVENOUS at 15:03

## 2024-02-08 RX ADMIN — ROCURONIUM BROMIDE 50 MG: 50 INJECTION, SOLUTION INTRAVENOUS at 14:33

## 2024-02-08 RX ADMIN — HYDROMORPHONE HYDROCHLORIDE 0.2 MG: 0.2 INJECTION, SOLUTION INTRAMUSCULAR; INTRAVENOUS; SUBCUTANEOUS at 16:12

## 2024-02-08 RX ADMIN — CHLORHEXIDINE GLUCONATE 0.12% ORAL RINSE 15 ML: 1.2 LIQUID ORAL at 12:39

## 2024-02-08 RX ADMIN — LIDOCAINE HYDROCHLORIDE 40 MG: 20 INJECTION, SOLUTION INFILTRATION; PERINEURAL at 14:33

## 2024-02-08 RX ADMIN — ONDANSETRON 4 MG: 2 INJECTION INTRAMUSCULAR; INTRAVENOUS at 14:51

## 2024-02-08 RX ADMIN — FENTANYL CITRATE 25 MCG: 50 INJECTION, SOLUTION INTRAMUSCULAR; INTRAVENOUS at 15:51

## 2024-02-08 RX ADMIN — DEXMEDETOMIDINE HYDROCHLORIDE 12 MCG: 200 INJECTION INTRAVENOUS at 14:57

## 2024-02-08 RX ADMIN — SUGAMMADEX 200 MG: 100 INJECTION, SOLUTION INTRAVENOUS at 15:24

## 2024-02-08 RX ADMIN — FENTANYL CITRATE 50 MCG: 50 INJECTION INTRAMUSCULAR; INTRAVENOUS at 14:33

## 2024-02-08 RX ADMIN — FENTANYL CITRATE 25 MCG: 50 INJECTION, SOLUTION INTRAMUSCULAR; INTRAVENOUS at 15:44

## 2024-02-08 RX ADMIN — OXYCODONE HYDROCHLORIDE AND ACETAMINOPHEN 1 TABLET: 5; 325 TABLET ORAL at 16:32

## 2024-02-08 RX ADMIN — Medication 100 MCG: at 15:09

## 2024-02-08 RX ADMIN — Medication 2 G: at 14:26

## 2024-02-08 RX ADMIN — MIDAZOLAM 2 MG: 1 INJECTION INTRAMUSCULAR; INTRAVENOUS at 14:26

## 2024-02-08 RX ADMIN — Medication 100 MCG: at 14:54

## 2024-02-08 RX ADMIN — DEXAMETHASONE SODIUM PHOSPHATE 4 MG: 4 INJECTION, SOLUTION INTRA-ARTICULAR; INTRALESIONAL; INTRAMUSCULAR; INTRAVENOUS; SOFT TISSUE at 14:36

## 2024-02-08 ASSESSMENT — ACTIVITIES OF DAILY LIVING (ADL)
ADLS_ACUITY_SCORE: 35

## 2024-02-08 ASSESSMENT — LIFESTYLE VARIABLES: TOBACCO_USE: 0

## 2024-02-08 NOTE — ANESTHESIA PREPROCEDURE EVALUATION
Anesthesia Pre-Procedure Evaluation    Patient: Rose Emerson   MRN: [id number] : 1965        Procedure : Procedure(s):  RIGHT TEMPOROMANDIBULAR JOINT ARTHROSCOPY WITH LYSIS AND LEVAGE          Past Medical History:   Diagnosis Date    Acne     Calculus of ureter     Cataract     Chronic TMJ pain     Congenital medullary sponge kidney     Dyshydrosis     Female infertility     Gastroesophageal reflux disease     Hx of previous reproductive problem [id number]    Lichen planus     Mixed hyperlipidemia     Osteopenia     Pain in joint, ankle and foot, left     Poor sleep     Thyroid disease 2010    Vaginal atrophy       Past Surgical History:   Procedure Laterality Date    ABDOMEN SURGERY  1998    left ovary removed    BIOPSY  2018, ,     vaginal, leg, and cheek    COLONOSCOPY      ENT SURGERY  10/2017    neck surgery-exicision of right neck mass    GENITOURINARY SURGERY      kidney stone surgery    GYN SURGERY      left ovary removed, infertility procedures, tubal ligation    HEAD & NECK SURGERY      wisdom teeth extraction    ORTHOPEDIC SURGERY      shoulder      Allergies   Allergen Reactions    Doxycycline      Pill: esophagitis, avoid tetracycline class    Norco [Hydrocodone-Acetaminophen] Nausea and Vomiting    Omeprazole Nausea     Increased reflux      Amoxicillin Hives and Rash    Atropine Rash    Sulfa Antibiotics Nausea and Rash    Wound Dressing Adhesive Rash      Social History     Tobacco Use    Smoking status: Never    Smokeless tobacco: Never   Substance Use Topics    Alcohol use: No      Wt Readings from Last 1 Encounters:   24 66.6 kg (146 lb 12.8 oz)        Anesthesia Evaluation   Pt has had prior anesthetic.     No history of anesthetic complications       ROS/MED HX  ENT/Pulmonary:    (-) tobacco use, asthma and sleep apnea   Neurologic:    (-) migraines   Cardiovascular:     (+) Dyslipidemia - -   -  - -                                   (-) pacemaker, stent,  "pacemaker and ICD   METS/Exercise Tolerance: >4 METS    Hematologic:       Musculoskeletal: Comment: TMJ pain      GI/Hepatic:     (+) GERD,                   Renal/Genitourinary: Comment: Medullary sponge kidney       Endo:     (+)          thyroid problem, hypothyroidism,           Psychiatric/Substance Use:  - neg psychiatric ROS     Infectious Disease:  - neg infectious disease ROS     Malignancy:  - neg malignancy ROS     Other:  - neg other ROS          Physical Exam    Airway        Mallampati: II   TM distance: > 3 FB   Neck ROM: full   Mouth opening: < 3 cm    Respiratory Devices and Support         Dental       (+) Modest Abnormalities - crowns, retainers, 1 or 2 missing teeth      Cardiovascular   cardiovascular exam normal          Pulmonary   pulmonary exam normal                OUTSIDE LABS:  CBC:   Lab Results   Component Value Date    HGB 14.2 03/25/2005     BMP: No results found for: \"NA\", \"POTASSIUM\", \"CHLORIDE\", \"CO2\", \"BUN\", \"CR\", \"GLC\"  COAGS: No results found for: \"PTT\", \"INR\", \"FIBR\"  POC: No results found for: \"BGM\", \"HCG\", \"HCGS\"  HEPATIC: No results found for: \"ALBUMIN\", \"PROTTOTAL\", \"ALT\", \"AST\", \"GGT\", \"ALKPHOS\", \"BILITOTAL\", \"BILIDIRECT\", \"SHAYY\"  OTHER: No results found for: \"PH\", \"LACT\", \"A1C\", \"SHANTA\", \"PHOS\", \"MAG\", \"LIPASE\", \"AMYLASE\", \"TSH\", \"T4\", \"T3\", \"CRP\", \"SED\"    Anesthesia Plan    ASA Status:  2       Anesthesia Type: General.     - Airway: ETT   Induction: Propofol.   Maintenance: Balanced.   Techniques and Equipment:     - Airway: Video-Laryngoscope       Consents    Anesthesia Plan(s) and associated risks, benefits, and realistic alternatives discussed. Questions answered and patient/representative(s) expressed understanding.     - Discussed:     - Discussed with:  Patient            Postoperative Care    Pain management: IV analgesics.   PONV prophylaxis: Ondansetron (or other 5HT-3), Background Propofol Infusion     Comments:               Armani Mayberry MD    I have " "reviewed the pertinent notes and labs in the chart from the past 30 days and (re)examined the patient.  Any updates or changes from those notes are reflected in this note.              # Overweight: Estimated body mass index is 25.2 kg/m  as calculated from the following:    Height as of this encounter: 1.626 m (5' 4\").    Weight as of this encounter: 66.6 kg (146 lb 12.8 oz).      "

## 2024-02-08 NOTE — ANESTHESIA POSTPROCEDURE EVALUATION
Patient: Rose Emerson    Procedure: Procedure(s):  RIGHT TEMPOROMANDIBULAR JOINT ARTHROSCOPY WITH LYSIS AND LEVAGE       Anesthesia Type:  General    Note:  Disposition: Outpatient   Postop Pain Control: Uneventful            Sign Out: Well controlled pain   PONV: No   Neuro/Psych: Uneventful            Sign Out: Acceptable/Baseline neuro status   Airway/Respiratory: Uneventful            Sign Out: Acceptable/Baseline resp. status   CV/Hemodynamics: Uneventful            Sign Out: Acceptable CV status   Other NRE: NONE   DID A NON-ROUTINE EVENT OCCUR?            Last vitals:  Vitals Value Taken Time   BP 92/62 02/08/24 1545   Temp 36.4  C (97.5  F) 02/08/24 1535   Pulse 70 02/08/24 1550   Resp 0 02/08/24 1550   SpO2 91 % 02/08/24 1550   Vitals shown include unfiled device data.    Electronically Signed By: Armani Mayberry MD  February 8, 2024  3:51 PM

## 2024-02-08 NOTE — ANESTHESIA CARE TRANSFER NOTE
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    Anesthesia Type:   General     Note:    Oropharynx: oropharynx clear of all foreign objects and spontaneously breathing  Level of Consciousness: drowsy  Oxygen Supplementation: nasal cannula  Level of Supplemental Oxygen (L/min / FiO2): 3  Independent Airway: airway patency satisfactory and stable  Dentition: dentition unchanged S/P dental procedure  Vital Signs Stable: post-procedure vital signs reviewed and stable  Report to RN Given: handoff report given  Patient transferred to: PACU    Handoff Report: Identifed the Patient, Identified the Reponsible Provider, Reviewed the pertinent medical history, Discussed the surgical course, Reviewed Intra-OP anesthesia mangement and issues during anesthesia, Set expectations for post-procedure period and Allowed opportunity for questions and acknowledgement of understanding      Vitals:  Vitals Value Taken Time   /61    Temp     Pulse 67 02/08/24 1536   Resp 11 02/08/24 1536   SpO2 99 % 02/08/24 1536   Vitals shown include unfiled device data.    Electronically Signed By: JORGE ALBERTO Shields CRNA  February 8, 2024  3:37 PM

## 2024-02-08 NOTE — OP NOTE
Oral & Maxillofacial Surgery Operative Report    Date:  2024    Patient Information:  Patient Name: Rose Emerson  Age: 58 year old  Sex: female  MRN: [id number]   : 1965    Pre-Op Diagnosis(es):  1.   Right Temporomandibular Joint Arthragia, Capsulitis  2.   Right Temporomandibular Joint Degenerative Joint Disease  3.   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   Right Temporomandibular Joint Arthroscopy with Lysis and Lavage.    Planned Anesthesia:  General via Oral ETT  Local Anesthesia via Intracapsular R TMJ Injection with 0.25% plain marcaine    Surgeon(s):  1) Fernando Vega DDS    Brief History and Indication for Operation:  Rose Emerson is 58 year old female who present to [facility] for dentoalveolar surgery.   Primary issues are that of chronic right sided TMJ closed lock with severe degenerative joint disease.  Patient referral and consultation notations have been reviewed.    Arthroscopy is an outpatient procedure given the armamentarium required.  A general anesthetic is preferred given improved outcomes with paralytic during arthroscopy.    A pre-operative history and physical has been completed and reviewed by myself as well as the anesthesia team.  The treatment plan was re-reviewed with the patient pre-operatively.  Risks and potential complications have been reviewed.  These include, but are not limited to, pain, bleeding, bruising, infection, inflammation, sinus injury, nerve injury (temporary & permanent) in the areas of operation.  The potential of damage to adjacent teeth and soft tissues structures have been reviewed.   Wound healing related complications have been reviewed as well as how to avoid them.    Anesthesia related complications have been reviewed by the Anesthesia team.  An opportunity to ask questions was also given.  Ultimately, the  patient agreed to the plan and has signed the consent.  I have also signed the operative permit. The site marking sheet has been completed.    Description of Procedure:  The patient was taken the operative suite and seated supine.   Induction began by the anesthesia team via IV access.   Following adequate depth, a Oral ETT was inserted on the 1st attempt.   End tidal CO2 and bilateral breath sounds were confirmed.   The eyes were protected.  The tube was secured by the OMFS and Anesthesia teams.   Both parties were satisfied.  The operative table was positioned. Lines/tubes were secured and positioned.  The patient as padded and protected.  .   The right temporomandibular joint was then sterilely prepped and draped in the standard fashion for arthroscopy.  This included preparation and draping the mouth with isolation for mandibular manipulation.  The camera arthroscope complex was prepared in combination with heparinized saline.  I confirmed the site marking.  A timeout per protocol was then performed.    Attention was given to the right temporomandibular joint.  The arthroscopy trocar john was then denoted with a marking pen using a ruler on the ala tragal line.  10 mm anterior 2 mm inferior.  I then palpated the right temporomandibular joint capsule.  The joint was then injected with quarter percent plain Marcaine.  I confirmed insufflation of the joint by manual confirmation of occlusion change.  The needle was withdrawn.  The sharp trocar cannula complex was then inserted into the right temporomandibular joint capsule on the first attempt with a anterior superior medial vector.  The sharp trocar complex was withdrawn.  Fluid from prior injection was noted in the cannula, confirming the space.  The camera was inserted.  I visually confirmed the joint space.  Numerous vascular markings fibrillations and adhesions were appreciated.  Unfortunately the Storz unit did not allow us to photograph as per typical.  As a  backup, I had the circulating nurse use my phone to record copies from the arthroscopic camera on the tower.  We will insert these into our patient chart and the [facility] clinic.   The camera was withdrawn.  200 cc of heparinized lactated Ringer's was then lavaged through the right temporomandibular joint using a single port technique.   The blunt trocar was inserted to free adhesions further.  I then injected 4 mg of dexamethasone followed by 50 mcg of fentanyl.  The complex was withdrawn and heavy pressure was held for 5 minutes.  The head drape was removed.  Range of motion exercises were then utilized while the patient was still paralyzed.  Range of motion was noted to be 37 mm.  Preoperative PÉREZ was only 15-18 unassisted.  My hope is that we can maintain what we have gained today with physical therapy.   The patient was given back to anesthesia for wake-up which was uneventful.  Surgical needle sponge counts were noted to be correct x 2.    Operative Time:  30min    Estimated Blood Loss:  1mL    Complications  None apparent      Drains:  None    Specimens:  None       Disposition:  Patient will be discharged as a same day surgery patient.  Follow-up will be scheduled through our office.  Post-op orders have been written.  Post-op medications have been prescribed and will include:  Percocet  Keflex    Signature:  Fernando Vega DDS    [facility]  [address], [address]  [place] [address]  Clinic/On-call Phone: [phone]  Clinic Fax: [phone]

## 2024-02-08 NOTE — ANESTHESIA PROCEDURE NOTES
Airway       Patient location during procedure: OR       Procedure Start/Stop Times: 2/8/2024 2:35 PM  Staff -        Anesthesiologist:  Armani Mayberry MD       CRNA: Alban Colin APRN CRNA       Performed By: CRNA  Consent for Airway        Urgency: elective  Indications and Patient Condition       Indications for airway management: taiwo-procedural       Induction type:intravenous       Mask difficulty assessment: 1 - vent by mask    Final Airway Details       Final airway type: endotracheal airway       Successful airway: ETT - single and Oral  Endotracheal Airway Details        ETT size (mm): 7.0       Cuffed: yes       Successful intubation technique: video laryngoscopy       VL Blade Size: Glidescope 3       Grade View of Cords: 1       Adjucts: stylet       Position: Left       Measured from: lips       Secured at (cm): 21       Bite block used: None    Post intubation assessment        Placement verified by: capnometry, equal breath sounds and chest rise        Number of attempts at approach: 1       Number of other approaches attempted: 0       Secured with: tape       Ease of procedure: easy       Dentition: Intact and Unchanged    Medication(s) Administered   Medication Administration Time: 2/8/2024 2:35 PM

## 2024-02-08 NOTE — DISCHARGE INSTRUCTIONS
Ok to use aleve if needed for pain. Ok to shower tomorrow, and use warm wash clothes as directed.    Same Day Surgery Discharge Instructions for  Sedation and General Anesthesia     It's not unusual to feel dizzy, light-headed or faint for up to 24 hours after surgery or while taking pain medication.  If you have these symptoms: sit for a few minutes before standing and have someone assist you when you get up to walk or use the bathroom.    You should rest and relax for the next 24 hours. We recommend you make arrangements to have an adult stay with you for at least 24 hours after your discharge.  Avoid hazardous and strenuous activity.    DO NOT DRIVE any vehicle or operate mechanical equipment for 24 hours following the end of your surgery.  Even though you may feel normal, your reactions may be affected by the medication you have received.    Do not drink alcoholic beverages for 24 hours following surgery.     Slowly progress to your regular diet as you feel able. It's not unusual to feel nauseated and/or vomit after receiving anesthesia.  If you develop these symptoms, drink clear liquids (apple juice, ginger ale, broth, 7-up, etc. ) until you feel better.  If your nausea and vomiting persists for 24 hours, please notify your surgeon.      All narcotic pain medications, along with inactivity and anesthesia, can cause constipation. Drinking plenty of liquids and increasing fiber intake will help.    For any questions of a medical nature, call your surgeon.    Do not make important decisions for 24 hours.    If you had general anesthesia, you may have a sore throat for a couple of days related to the breathing tube used during surgery.  You may use Cepacol lozenges to help with this discomfort.  If it worsens or if you develop a fever, contact your surgeon.     If you feel your pain is not well managed with the pain medications prescribed by your surgeon, please contact your surgeon's office to let them know so  they can address your concerns.       CARE FOLLOWING SURGERY  [facility]  Ronit Nix, Tommy, Roxane, Dajuan Smith, rGegg Vega, Aisha, Susan      Immediately following your surgery:   - Begin brushing your teeth the evening of surgery.   - Avoid rigorous exercise and get adequate rest for the first two to three days.   - Do not drive for 24 hours after having I.V. Anesthesia.  Do not drive while taking a prescription pain medication (not including prescription ibuprofen).   - For the first 7 days, avoid smoking, or vigorous rinsing.    1.  BLEEDING:  Some oozing may continue for the first 24-48 hours and is not unexpected.  If bleeding persists, apply the gauze directly over the extraction site and bite down firmly for 30 minutes.  You may remove the gauze to eat or drink and replace if needed.  2.  SWELLING:  Apply ice packs on and off for 30 minutes for 24-48 hours after your surgery, excluding overnight, to the outside of your face over the surgery site(s).  Do not apply heat.  Swelling is to be expected following surgery and peaks 2-3 days after.  Elevating your head in the first 48 hours will minimize swelling.  3.  TOBACCO:  Do not smoke or use tobacco products for 7 days.  Smoking greatly increases your risk of infections and dry sockets and will delay healing.  4.  DIET:  After surgery ok for soft foods, until check up.    5.  REST & FLUIDS:  After I.V. anesthesia, drink plenty of fluids.  Be sure to get 8-10 hours of sleep at night.    6.  PAIN:  Ok to use aleve.  The stronger pain medication may be used in addition if necessary (make sure to take with food to avoid nausea).   7.  FEVER:  A low grade fever is likely.  If questionable, do not hesitate to call.  8.  NAUSEA:  Nausea may occur following general anesthesia.  Treat with clear liquids and advance diet as tolerated.  If vomiting is a problem, call our office.  9.  NUMBNESS:  We often use a  long-acting local anesthetic that may remain in effect the entire day.        EMERGENCY CALLS  If you have questions or concerns, please call our office between the hours of 7am to 4pm Monday through Friday.  If you have an emergency, please call our office; if during non-business hours, the answering service will contact the doctor on call.  We do not refill pain prescriptions during the evenings or weekends.    Yudy Littlejohn  [facility]  [phone]    **If you have questions or concerns about your procedure,  call Dr. Vega at [phone]**

## 2024-02-08 NOTE — PROGRESS NOTES
Oral & Maxillofacial Surgery Pre-Operative Note    Date:  2024    Patient Information:  Patient Name: Rose Emerson  MRN: [id number]   : 1965    Pre-Op Diagnosis(es):  1.  Right TMJ Arthralgia, Capsulitis, DJD  2.  Right TMJ Closed Lock    Planned Procedure(s):  1.  Right TMJ Arthroscopy with Lysis and Lavage    Planned Anesthesia:  General & Local Anesthesia.  Tube Type & Side Preference: OETT--Tape to Left.    Surgeon(s):  Fernando Vega DDS    Pre-Op Labs/Meds/H&P:  Reviewed, Updated.    Risks/Benefits/Potential Complications:  Reviewed & Discussed.  They include, but are not limited to: pain, bleeding, bruising, infection, inflammation, nerve injury, damage to adjacent soft tissue structures, need for further surgery, unforseen complications, partoid gland injury, ear canal perforation, facial nerve injury, middle cranial fossa perforation, severe bleeding, instrument breakage.    Consent:  Verified    Signature:  Fernando Vega DDS    [facility]  [facility] [address]  [place] [address]  Clinic/On-call Phone: [phone]  Clinic Fax: [phone]

## 2024-02-08 NOTE — BRIEF OP NOTE
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  Complications: None.  Implants: None

## 2024-04-14 ENCOUNTER — HEALTH MAINTENANCE LETTER (OUTPATIENT)
Age: 59
End: 2024-04-14

## 2025-04-14 ENCOUNTER — LAB REQUISITION (OUTPATIENT)
Dept: LAB | Facility: CLINIC | Age: 60
End: 2025-04-14

## 2025-04-14 DIAGNOSIS — Z12.4 ENCOUNTER FOR SCREENING FOR MALIGNANT NEOPLASM OF CERVIX: ICD-10-CM

## 2025-04-14 PROCEDURE — 87624 HPV HI-RISK TYP POOLED RSLT: CPT | Performed by: OBSTETRICS & GYNECOLOGY

## 2025-04-17 LAB
HPV HR 12 DNA CVX QL NAA+PROBE: NEGATIVE
HPV16 DNA CVX QL NAA+PROBE: NEGATIVE
HPV18 DNA CVX QL NAA+PROBE: NEGATIVE
HUMAN PAPILLOMA VIRUS FINAL DIAGNOSIS: NORMAL

## 2025-04-19 ENCOUNTER — HEALTH MAINTENANCE LETTER (OUTPATIENT)
Age: 60
End: 2025-04-19

## 2025-08-23 ENCOUNTER — HEALTH MAINTENANCE LETTER (OUTPATIENT)
Age: 60
End: 2025-08-23

## (undated) DEVICE — DRAPE STERI APERATURE 51X33" 1030

## (undated) DEVICE — SYR 50ML LL W/O NDL 309653

## (undated) DEVICE — LINEN TOWEL PACK X5 5464

## (undated) DEVICE — PACK HEAD NECK SEN15HNFSF

## (undated) DEVICE — CONNECTOR STOPCOCK 3 WAY MALE LL 2C6204

## (undated) DEVICE — SOL ADH LIQUID BENZOIN SWAB 0.6ML C1544

## (undated) DEVICE — TUBING IV EXTENSION SET 34"

## (undated) DEVICE — GOWN LG DISP 9515

## (undated) DEVICE — SYR 05ML LL W/O NDL

## (undated) DEVICE — DECANTER BAG 2002S

## (undated) DEVICE — BLADE KNIFE SURG 11 371111

## (undated) DEVICE — DRSG JAWBRA 93

## (undated) DEVICE — CONNECTOR STOPCOCK 3 WAY MALE LL MX5311L

## (undated) DEVICE — SYR 03ML LL W/O NDL 309657

## (undated) DEVICE — SOL WATER IRRIG 1000ML BOTTLE 2F7114

## (undated) DEVICE — DRAPE STERI TOWEL LG 1010

## (undated) DEVICE — DRSG KERLIX FLUFFS X5

## (undated) DEVICE — DRAPE POUCH IRR 1016

## (undated) DEVICE — SOL RINGERS LACTATED 1000ML BAG 2B2324X

## (undated) DEVICE — NDL 25GA 1.5" 305127

## (undated) DEVICE — SOL NACL 0.9% IRRIG 1000ML BOTTLE 2F7124

## (undated) DEVICE — TUBING ARTHRO SM JOINT SET  9350

## (undated) DEVICE — SYR 05ML SLIP TIP W/O NDL 309647

## (undated) RX ORDER — HYDROMORPHONE HCL IN WATER/PF 6 MG/30 ML
PATIENT CONTROLLED ANALGESIA SYRINGE INTRAVENOUS
Status: DISPENSED
Start: 2024-02-08

## (undated) RX ORDER — FENTANYL CITRATE 0.05 MG/ML
INJECTION, SOLUTION INTRAMUSCULAR; INTRAVENOUS
Status: DISPENSED
Start: 2024-02-08

## (undated) RX ORDER — CEFAZOLIN SODIUM/WATER 2 G/20 ML
SYRINGE (ML) INTRAVENOUS
Status: DISPENSED
Start: 2024-02-08

## (undated) RX ORDER — ONDANSETRON 2 MG/ML
INJECTION INTRAMUSCULAR; INTRAVENOUS
Status: DISPENSED
Start: 2024-02-08

## (undated) RX ORDER — PROPOFOL 10 MG/ML
INJECTION, EMULSION INTRAVENOUS
Status: DISPENSED
Start: 2024-02-08

## (undated) RX ORDER — DEXAMETHASONE SODIUM PHOSPHATE 4 MG/ML
INJECTION, SOLUTION INTRA-ARTICULAR; INTRALESIONAL; INTRAMUSCULAR; INTRAVENOUS; SOFT TISSUE
Status: DISPENSED
Start: 2024-02-08

## (undated) RX ORDER — CHLORHEXIDINE GLUCONATE ORAL RINSE 1.2 MG/ML
SOLUTION DENTAL
Status: DISPENSED
Start: 2024-02-08

## (undated) RX ORDER — FENTANYL CITRATE 50 UG/ML
INJECTION, SOLUTION INTRAMUSCULAR; INTRAVENOUS
Status: DISPENSED
Start: 2024-02-08

## (undated) RX ORDER — OXYCODONE AND ACETAMINOPHEN 5; 325 MG/1; MG/1
TABLET ORAL
Status: DISPENSED
Start: 2024-02-08